# Patient Record
Sex: FEMALE | Race: WHITE | URBAN - NONMETROPOLITAN AREA
[De-identification: names, ages, dates, MRNs, and addresses within clinical notes are randomized per-mention and may not be internally consistent; named-entity substitution may affect disease eponyms.]

---

## 2023-06-26 ENCOUNTER — WEB ENCOUNTER (OUTPATIENT)
Dept: URBAN - NONMETROPOLITAN AREA CLINIC 4 | Facility: CLINIC | Age: 43
End: 2023-06-26

## 2023-06-27 ENCOUNTER — LAB OUTSIDE AN ENCOUNTER (OUTPATIENT)
Dept: URBAN - NONMETROPOLITAN AREA CLINIC 4 | Facility: CLINIC | Age: 43
End: 2023-06-27

## 2023-06-27 ENCOUNTER — OFFICE VISIT (OUTPATIENT)
Dept: URBAN - NONMETROPOLITAN AREA CLINIC 4 | Facility: CLINIC | Age: 43
End: 2023-06-27
Payer: COMMERCIAL

## 2023-06-27 ENCOUNTER — WEB ENCOUNTER (OUTPATIENT)
Dept: URBAN - NONMETROPOLITAN AREA CLINIC 4 | Facility: CLINIC | Age: 43
End: 2023-06-27

## 2023-06-27 VITALS
BODY MASS INDEX: 37.28 KG/M2 | DIASTOLIC BLOOD PRESSURE: 78 MMHG | WEIGHT: 202.6 LBS | HEIGHT: 62 IN | SYSTOLIC BLOOD PRESSURE: 108 MMHG | TEMPERATURE: 98.8 F | HEART RATE: 88 BPM

## 2023-06-27 DIAGNOSIS — D50.9 IRON DEFICIENCY ANEMIA, UNSPECIFIED IRON DEFICIENCY ANEMIA TYPE: ICD-10-CM

## 2023-06-27 DIAGNOSIS — D75.839 THROMBOCYTOSIS: ICD-10-CM

## 2023-06-27 PROCEDURE — 99243 OFF/OP CNSLTJ NEW/EST LOW 30: CPT | Performed by: INTERNAL MEDICINE

## 2023-06-27 PROCEDURE — 99203 OFFICE O/P NEW LOW 30 MIN: CPT | Performed by: INTERNAL MEDICINE

## 2023-06-27 RX ORDER — BUPROPION HYDROCHLORIDE 150 MG/1
1 TABLET IN THE MORNING TABLET, FILM COATED, EXTENDED RELEASE ORAL ONCE A DAY
Status: ACTIVE | COMMUNITY

## 2023-06-27 RX ORDER — CLONAZEPAM 0.5 MG/1
1 TABLET TABLET ORAL ONCE A DAY
Status: ACTIVE | COMMUNITY

## 2023-06-27 RX ORDER — ESCITALOPRAM OXALATE 10 MG/1
1 TABLET TABLET ORAL ONCE A DAY
Status: ACTIVE | COMMUNITY

## 2023-06-27 RX ORDER — CYCLOBENZAPRINE HYDROCHLORIDE 5 MG/1
1 TABLET AT BEDTIME AS NEEDED TABLET, FILM COATED ORAL ONCE A DAY
Status: ACTIVE | COMMUNITY

## 2023-06-27 RX ORDER — SODIUM PICOSULFATE, MAGNESIUM OXIDE, AND ANHYDROUS CITRIC ACID 10; 3.5; 12 MG/160ML; G/160ML; G/160ML
160 ML THE FIRST DOSE THE EVENING BEFORE AND SECOND DOSE THE MORNING OF COLONOSCOPY LIQUID ORAL ONCE A DAY
Qty: 320 | OUTPATIENT
Start: 2023-06-27 | End: 2023-06-29

## 2023-06-27 NOTE — HPI-TODAY'S VISIT:
Patient referred by Dr. Adrian Anderson for eval and tx of CAMERON with reactive thrombocytosis. A copy of this consultation will be sent.  42 yo female with hx of CAMERON x 10 yrs without heavy menses until more recently presents for eval. No overt bleeding. No bowel complaints. No bloating. No fhx of celiac h pylori or crc. No wt changes. Taking iron otc every other day

## 2023-08-04 ENCOUNTER — OFFICE VISIT (OUTPATIENT)
Dept: URBAN - METROPOLITAN AREA SURGERY CENTER 14 | Facility: SURGERY CENTER | Age: 43
End: 2023-08-04
Payer: COMMERCIAL

## 2023-08-04 ENCOUNTER — CLAIMS CREATED FROM THE CLAIM WINDOW (OUTPATIENT)
Dept: URBAN - METROPOLITAN AREA CLINIC 4 | Facility: CLINIC | Age: 43
End: 2023-08-04
Payer: COMMERCIAL

## 2023-08-04 DIAGNOSIS — K31.89: ICD-10-CM

## 2023-08-04 DIAGNOSIS — K31.7 BENIGN GASTRIC POLYP: ICD-10-CM

## 2023-08-04 DIAGNOSIS — K63.5 BENIGN COLON POLYP: ICD-10-CM

## 2023-08-04 DIAGNOSIS — K31.89 OTHER DISEASES OF STOMACH AND DUODENUM: ICD-10-CM

## 2023-08-04 DIAGNOSIS — K29.80 DUODENITIS: ICD-10-CM

## 2023-08-04 DIAGNOSIS — D50.9 IRON DEFICIENCY ANEMIA, UNSPECIFIED IRON DEFICIENCY ANEMIA TYPE: ICD-10-CM

## 2023-08-04 PROCEDURE — 88312 SPECIAL STAINS GROUP 1: CPT | Performed by: PATHOLOGY

## 2023-08-04 PROCEDURE — 43239 EGD BIOPSY SINGLE/MULTIPLE: CPT | Performed by: INTERNAL MEDICINE

## 2023-08-04 PROCEDURE — G8907 PT DOC NO EVENTS ON DISCHARG: HCPCS | Performed by: INTERNAL MEDICINE

## 2023-08-04 PROCEDURE — 45385 COLONOSCOPY W/LESION REMOVAL: CPT | Performed by: INTERNAL MEDICINE

## 2023-08-04 PROCEDURE — 88305 TISSUE EXAM BY PATHOLOGIST: CPT | Performed by: PATHOLOGY

## 2023-08-18 ENCOUNTER — DASHBOARD ENCOUNTERS (OUTPATIENT)
Age: 43
End: 2023-08-18

## 2023-08-18 ENCOUNTER — OFFICE VISIT (OUTPATIENT)
Dept: URBAN - NONMETROPOLITAN AREA CLINIC 4 | Facility: CLINIC | Age: 43
End: 2023-08-18
Payer: COMMERCIAL

## 2023-08-18 VITALS
DIASTOLIC BLOOD PRESSURE: 87 MMHG | BODY MASS INDEX: 35.15 KG/M2 | HEIGHT: 62 IN | HEART RATE: 90 BPM | WEIGHT: 191 LBS | SYSTOLIC BLOOD PRESSURE: 142 MMHG | TEMPERATURE: 97.7 F

## 2023-08-18 DIAGNOSIS — D75.839 THROMBOCYTOSIS: ICD-10-CM

## 2023-08-18 DIAGNOSIS — D50.9 IRON DEFICIENCY ANEMIA, UNSPECIFIED IRON DEFICIENCY ANEMIA TYPE: ICD-10-CM

## 2023-08-18 DIAGNOSIS — K57.90 DIVERTICULOSIS: ICD-10-CM

## 2023-08-18 DIAGNOSIS — K63.5 HYPERPLASTIC POLYP OF SIGMOID COLON: ICD-10-CM

## 2023-08-18 PROBLEM — 87522002: Status: ACTIVE | Noted: 2023-06-27

## 2023-08-18 PROBLEM — 397881000: Status: ACTIVE | Noted: 2023-08-18

## 2023-08-18 PROCEDURE — 99213 OFFICE O/P EST LOW 20 MIN: CPT | Performed by: REGISTERED NURSE

## 2023-08-18 RX ORDER — CLONAZEPAM 0.5 MG/1
1 TABLET TABLET ORAL ONCE A DAY
Status: ACTIVE | COMMUNITY

## 2023-08-18 RX ORDER — CYCLOBENZAPRINE HYDROCHLORIDE 5 MG/1
1 TABLET AT BEDTIME AS NEEDED TABLET, FILM COATED ORAL ONCE A DAY
Status: ACTIVE | COMMUNITY

## 2023-08-18 RX ORDER — BUPROPION HYDROCHLORIDE 150 MG/1
1 TABLET IN THE MORNING TABLET, FILM COATED, EXTENDED RELEASE ORAL ONCE A DAY
Status: ACTIVE | COMMUNITY

## 2023-08-18 RX ORDER — ESCITALOPRAM OXALATE 10 MG/1
1 TABLET TABLET ORAL ONCE A DAY
Status: ACTIVE | COMMUNITY

## 2023-08-18 NOTE — HPI-TODAY'S VISIT:
Patient referred by Dr. Adrian Anderson for eval and tx of CAMERON with reactive thrombocytosis. A copy of this consultation will be sent.  42 yo female with hx of CAMERON x 10 yrs without heavy menses until more recently presents for eval. No overt bleeding. No bowel complaints. No bloating. No fhx of celiac h pylori or crc. No wt changes. Taking iron otc every other day  8/18/23: Pt RTC for f/u. Had EGD/colon 8/4/23,  EGD findings: - Z-line regular, 35 cm from the incisors. - Multiple gastric polyps. Biopsied. - Gastric mucosal atrophy. Biopsied. - A few duodenal polyps. Biopsied. - The examination was otherwise normal. - Biopsies were taken with a cold forceps for evaluation of celiac disease. Bx c/w benign inflammation Cscope findings: - One 7 mm polyp in the sigmoid colon, removed with a cold snare. Resected and retrieved. - Diverticulosis in the entire examined colon. - The examination was otherwise normal. - The examined portion of the ileum was normal. Bx c/w hyperplastic polyp  She has been following a GF diet. Reports her anxiety has been worse not knowing what her results were and if she could eat certain things. It has caused her to have diarrhea and feeling full after eating a small amount of food. No other GI complaints.

## 2023-08-31 ENCOUNTER — TELEPHONE ENCOUNTER (OUTPATIENT)
Dept: URBAN - METROPOLITAN AREA CLINIC 54 | Facility: CLINIC | Age: 43
End: 2023-08-31

## 2023-08-31 ENCOUNTER — LAB OUTSIDE AN ENCOUNTER (OUTPATIENT)
Dept: URBAN - NONMETROPOLITAN AREA CLINIC 4 | Facility: CLINIC | Age: 43
End: 2023-08-31

## 2023-10-13 ENCOUNTER — OFFICE VISIT (OUTPATIENT)
Dept: URBAN - METROPOLITAN AREA CLINIC 53 | Facility: CLINIC | Age: 43
End: 2023-10-13